# Patient Record
Sex: FEMALE | Race: WHITE | NOT HISPANIC OR LATINO
[De-identification: names, ages, dates, MRNs, and addresses within clinical notes are randomized per-mention and may not be internally consistent; named-entity substitution may affect disease eponyms.]

---
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
## 2018-08-20 ENCOUNTER — RESULT REVIEW (OUTPATIENT)
Age: 18
End: 2018-08-20

## 2019-08-29 ENCOUNTER — RESULT REVIEW (OUTPATIENT)
Age: 19
End: 2019-08-29

## 2020-02-24 ENCOUNTER — TRANSCRIPTION ENCOUNTER (OUTPATIENT)
Age: 20
End: 2020-02-24

## 2020-04-26 ENCOUNTER — MESSAGE (OUTPATIENT)
Age: 20
End: 2020-04-26

## 2020-05-07 ENCOUNTER — APPOINTMENT (OUTPATIENT)
Dept: DISASTER EMERGENCY | Facility: CLINIC | Age: 20
End: 2020-05-07

## 2020-05-08 LAB
SARS-COV-2 IGG SERPL IA-ACNC: <0.1 INDEX
SARS-COV-2 IGG SERPL QL IA: NEGATIVE

## 2020-09-01 ENCOUNTER — RESULT REVIEW (OUTPATIENT)
Age: 20
End: 2020-09-01

## 2020-10-30 ENCOUNTER — TRANSCRIPTION ENCOUNTER (OUTPATIENT)
Age: 20
End: 2020-10-30

## 2021-09-07 ENCOUNTER — RESULT REVIEW (OUTPATIENT)
Age: 21
End: 2021-09-07

## 2022-08-10 ENCOUNTER — NON-APPOINTMENT (OUTPATIENT)
Age: 22
End: 2022-08-10

## 2022-09-13 ENCOUNTER — RESULT REVIEW (OUTPATIENT)
Age: 22
End: 2022-09-13

## 2023-02-17 ENCOUNTER — NON-APPOINTMENT (OUTPATIENT)
Age: 23
End: 2023-02-17

## 2023-02-17 ENCOUNTER — APPOINTMENT (OUTPATIENT)
Dept: CARDIOLOGY | Facility: CLINIC | Age: 23
End: 2023-02-17
Payer: COMMERCIAL

## 2023-02-17 VITALS
HEART RATE: 70 BPM | SYSTOLIC BLOOD PRESSURE: 116 MMHG | WEIGHT: 169 LBS | HEIGHT: 68 IN | OXYGEN SATURATION: 100 % | DIASTOLIC BLOOD PRESSURE: 73 MMHG | BODY MASS INDEX: 25.61 KG/M2

## 2023-02-17 DIAGNOSIS — Z80.3 FAMILY HISTORY OF MALIGNANT NEOPLASM OF BREAST: ICD-10-CM

## 2023-02-17 DIAGNOSIS — Z82.3 FAMILY HISTORY OF STROKE: ICD-10-CM

## 2023-02-17 DIAGNOSIS — G93.0 CEREBRAL CYSTS: ICD-10-CM

## 2023-02-17 DIAGNOSIS — R00.2 PALPITATIONS: ICD-10-CM

## 2023-02-17 DIAGNOSIS — R55 SYNCOPE AND COLLAPSE: ICD-10-CM

## 2023-02-17 PROCEDURE — 99204 OFFICE O/P NEW MOD 45 MIN: CPT

## 2023-02-17 PROCEDURE — 93000 ELECTROCARDIOGRAM COMPLETE: CPT

## 2023-02-18 PROBLEM — R55 SYNCOPE: Status: ACTIVE | Noted: 2023-02-14

## 2023-02-18 PROBLEM — Z80.3 FAMILY HISTORY OF MALIGNANT NEOPLASM OF BREAST: Status: ACTIVE | Noted: 2023-02-18

## 2023-02-18 PROBLEM — G93.0 ARACHNOID CYST: Status: ACTIVE | Noted: 2023-02-18

## 2023-02-18 PROBLEM — R00.2 PALPITATIONS: Status: ACTIVE | Noted: 2023-02-18

## 2023-02-18 PROBLEM — Z82.3 FAMILY HISTORY OF CEREBROVASCULAR ACCIDENT (CVA): Status: ACTIVE | Noted: 2023-02-18

## 2023-02-18 RX ORDER — MEDROXYPROGESTERONE ACETATE 150 MG/ML
150 INJECTION, SUSPENSION INTRAMUSCULAR
Qty: 1 | Refills: 0 | Status: ACTIVE | COMMUNITY
Start: 2022-08-15

## 2023-02-18 NOTE — DISCUSSION/SUMMARY
[EKG obtained to assist in diagnosis and management of assessed problem(s)] : EKG obtained to assist in diagnosis and management of assessed problem(s) [FreeTextEntry1] : In summary, Ms. Guerrero is a 22 year old female with a medical history significant for a cerebral arachnoid cyst, which was discovered during testing performed when she began having migraine headaches several years ago.  Recent imaging studies demonstrated an approximate 6 cm arachnoid cyst located in the left cerebellopontine angle.  Ms. Guerrero is followed by a neurosurgeon for the arachnoid cyst and, as per the patient, there has thus far been no indication for surgical treatment of the cyst.\par \par Ms. Guerrero presents to the office with a recent history of frequent episodes of presyncope as well as a single episode of syncope of uncertain etiology.  The presyncopal episodes are often preceded by the sensation of palpitations.  I suggested to Ms. Guerrero that we proceed with a transthoracic echocardiogram in addition to a 14-day mobile cardiac outpatient telemetry (MCOT) for further evaluation.  Ms. Guerrero will follow up with me once I obtain the results of these investigations.

## 2023-02-18 NOTE — REASON FOR VISIT
[FreeTextEntry1] : Ms. Guerrero presents to the office today for an initial cardiovascular evaluation.

## 2023-02-18 NOTE — HISTORY OF PRESENT ILLNESS
[FreeTextEntry1] : Ms. Guerrero presents to the office today for an initial cardiovascular evaluation.\par \par Ms. Guerrero is a 22 year old female with a medical history significant for a cerebral arachnoid cyst, which was discovered during testing performed when she began having migraine headaches several years ago.  Recent imaging studies demonstrated an approximate 6 cm arachnoid cyst located in the left cerebellopontine angle.  Ms. Guerrero is followed by a neurosurgeon for the arachnoid cyst and, as per the patient, there has thus far been no indication for surgical treatment of the cyst.\par \par Ms. Guerrero reports having had multiple episodes of presyncope.  The episodes are characterized by a change in her vision (a blurry transformation) in addition to altered hearing.  There does not appear to be any specific triggering factor causing the episodes of presyncope.  Ms. Guerrero reports that she often feels her heart racing prior to the episodes of presyncope.  Ms. Guerrero did experience an episode of syncope, which occurred approximately one month ago while she was attending a music concert.  She reports that she was unconscious for approximately one minute during the episode but then quickly regained alertness and consciousness.\par \par There has been no history of chest pain or dyspnea either at rest or with exertion.  In addition, Ms. Guerrero denies having any orthopnea, paroxysmal nocturnal dyspnea, or edema.  Ms. Guerrero reports that she underwent an EEG many years ago and this apparently did not show any evidence of seizure activity (this may have been prior to the discovery of the arachnoid cyst).  Of note is that Ms. Guerrero is scheduled to undergo an MRA study of the brain in early March 2023.

## 2023-02-22 ENCOUNTER — APPOINTMENT (OUTPATIENT)
Dept: CV DIAGNOSITCS | Facility: HOSPITAL | Age: 23
End: 2023-02-22

## 2023-02-22 ENCOUNTER — OUTPATIENT (OUTPATIENT)
Dept: OUTPATIENT SERVICES | Facility: HOSPITAL | Age: 23
LOS: 1 days | End: 2023-02-22
Payer: COMMERCIAL

## 2023-02-22 ENCOUNTER — TRANSCRIPTION ENCOUNTER (OUTPATIENT)
Age: 23
End: 2023-02-22

## 2023-02-22 DIAGNOSIS — R55 SYNCOPE AND COLLAPSE: ICD-10-CM

## 2023-02-22 PROCEDURE — 93306 TTE W/DOPPLER COMPLETE: CPT | Mod: 26
